# Patient Record
Sex: MALE | Race: BLACK OR AFRICAN AMERICAN | NOT HISPANIC OR LATINO | Employment: UNEMPLOYED | URBAN - METROPOLITAN AREA
[De-identification: names, ages, dates, MRNs, and addresses within clinical notes are randomized per-mention and may not be internally consistent; named-entity substitution may affect disease eponyms.]

---

## 2024-07-22 ENCOUNTER — HOSPITAL ENCOUNTER (EMERGENCY)
Facility: HOSPITAL | Age: 59
Discharge: HOME | End: 2024-07-23
Payer: COMMERCIAL

## 2024-07-22 VITALS
WEIGHT: 180 LBS | TEMPERATURE: 98.5 F | DIASTOLIC BLOOD PRESSURE: 86 MMHG | SYSTOLIC BLOOD PRESSURE: 123 MMHG | BODY MASS INDEX: 25.77 KG/M2 | OXYGEN SATURATION: 96 % | HEART RATE: 106 BPM | RESPIRATION RATE: 16 BRPM | HEIGHT: 70 IN

## 2024-07-22 PROCEDURE — 4500999001 HC ED NO CHARGE

## 2024-07-22 ASSESSMENT — PAIN SCALES - GENERAL: PAINLEVEL_OUTOF10: 0 - NO PAIN

## 2024-07-22 ASSESSMENT — COLUMBIA-SUICIDE SEVERITY RATING SCALE - C-SSRS
1. IN THE PAST MONTH, HAVE YOU WISHED YOU WERE DEAD OR WISHED YOU COULD GO TO SLEEP AND NOT WAKE UP?: NO
2. HAVE YOU ACTUALLY HAD ANY THOUGHTS OF KILLING YOURSELF?: NO
6. HAVE YOU EVER DONE ANYTHING, STARTED TO DO ANYTHING, OR PREPARED TO DO ANYTHING TO END YOUR LIFE?: NO

## 2024-07-22 ASSESSMENT — PAIN - FUNCTIONAL ASSESSMENT: PAIN_FUNCTIONAL_ASSESSMENT: 0-10

## 2024-12-01 ENCOUNTER — HOSPITAL ENCOUNTER (EMERGENCY)
Facility: HOSPITAL | Age: 59
Discharge: HOME | End: 2024-12-01
Attending: EMERGENCY MEDICINE
Payer: COMMERCIAL

## 2024-12-01 ENCOUNTER — CLINICAL SUPPORT (OUTPATIENT)
Dept: EMERGENCY MEDICINE | Facility: HOSPITAL | Age: 59
End: 2024-12-01
Payer: COMMERCIAL

## 2024-12-01 VITALS
SYSTOLIC BLOOD PRESSURE: 137 MMHG | TEMPERATURE: 97.5 F | WEIGHT: 180 LBS | HEART RATE: 89 BPM | RESPIRATION RATE: 17 BRPM | BODY MASS INDEX: 25.77 KG/M2 | DIASTOLIC BLOOD PRESSURE: 75 MMHG | OXYGEN SATURATION: 100 % | HEIGHT: 70 IN

## 2024-12-01 DIAGNOSIS — F91.9 BEHAVIOR DISTURBANCE: ICD-10-CM

## 2024-12-01 DIAGNOSIS — T51.94XA ALCOHOL POISONING, UNDETERMINED INTENT, INITIAL ENCOUNTER: Primary | ICD-10-CM

## 2024-12-01 LAB
ALBUMIN SERPL BCP-MCNC: 3.8 G/DL (ref 3.4–5)
ALP SERPL-CCNC: 72 U/L (ref 33–136)
ALT SERPL W P-5'-P-CCNC: 13 U/L (ref 10–52)
AMPHETAMINES UR QL SCN: ABNORMAL
ANION GAP SERPL CALC-SCNC: 15 MMOL/L (ref 10–20)
APAP SERPL-MCNC: <10 UG/ML
APPEARANCE UR: CLEAR
AST SERPL W P-5'-P-CCNC: 22 U/L (ref 9–39)
ATRIAL RATE: 92 BPM
BARBITURATES UR QL SCN: ABNORMAL
BASOPHILS # BLD AUTO: 0.07 X10*3/UL (ref 0–0.1)
BASOPHILS NFR BLD AUTO: 1.1 %
BENZODIAZ UR QL SCN: ABNORMAL
BILIRUB SERPL-MCNC: 0.4 MG/DL (ref 0–1.2)
BILIRUB UR STRIP.AUTO-MCNC: NEGATIVE MG/DL
BUN SERPL-MCNC: 12 MG/DL (ref 6–23)
BZE UR QL SCN: ABNORMAL
CALCIUM SERPL-MCNC: 8.8 MG/DL (ref 8.6–10.6)
CANNABINOIDS UR QL SCN: ABNORMAL
CHLORIDE SERPL-SCNC: 104 MMOL/L (ref 98–107)
CO2 SERPL-SCNC: 22 MMOL/L (ref 21–32)
COLOR UR: NORMAL
CREAT SERPL-MCNC: 0.98 MG/DL (ref 0.5–1.3)
EGFRCR SERPLBLD CKD-EPI 2021: 88 ML/MIN/1.73M*2
EOSINOPHIL # BLD AUTO: 0.29 X10*3/UL (ref 0–0.7)
EOSINOPHIL NFR BLD AUTO: 4.7 %
ERYTHROCYTE [DISTWIDTH] IN BLOOD BY AUTOMATED COUNT: 11.5 % (ref 11.5–14.5)
ETHANOL SERPL-MCNC: 196 MG/DL
FENTANYL+NORFENTANYL UR QL SCN: ABNORMAL
GLUCOSE SERPL-MCNC: 79 MG/DL (ref 74–99)
GLUCOSE UR STRIP.AUTO-MCNC: NORMAL MG/DL
HCT VFR BLD AUTO: 34.5 % (ref 41–52)
HGB BLD-MCNC: 11.9 G/DL (ref 13.5–17.5)
IMM GRANULOCYTES # BLD AUTO: 0.02 X10*3/UL (ref 0–0.7)
IMM GRANULOCYTES NFR BLD AUTO: 0.3 % (ref 0–0.9)
KETONES UR STRIP.AUTO-MCNC: NEGATIVE MG/DL
LEUKOCYTE ESTERASE UR QL STRIP.AUTO: NEGATIVE
LYMPHOCYTES # BLD AUTO: 1.46 X10*3/UL (ref 1.2–4.8)
LYMPHOCYTES NFR BLD AUTO: 23.6 %
MCH RBC QN AUTO: 32.9 PG (ref 26–34)
MCHC RBC AUTO-ENTMCNC: 34.5 G/DL (ref 32–36)
MCV RBC AUTO: 95 FL (ref 80–100)
METHADONE UR QL SCN: ABNORMAL
MONOCYTES # BLD AUTO: 0.57 X10*3/UL (ref 0.1–1)
MONOCYTES NFR BLD AUTO: 9.2 %
NEUTROPHILS # BLD AUTO: 3.78 X10*3/UL (ref 1.2–7.7)
NEUTROPHILS NFR BLD AUTO: 61.1 %
NITRITE UR QL STRIP.AUTO: NEGATIVE
NRBC BLD-RTO: 0 /100 WBCS (ref 0–0)
OPIATES UR QL SCN: ABNORMAL
OXYCODONE+OXYMORPHONE UR QL SCN: ABNORMAL
P AXIS: 46 DEGREES
P OFFSET: 208 MS
P ONSET: 139 MS
PCP UR QL SCN: ABNORMAL
PH UR STRIP.AUTO: 5 [PH]
PLATELET # BLD AUTO: 187 X10*3/UL (ref 150–450)
POTASSIUM SERPL-SCNC: 3.4 MMOL/L (ref 3.5–5.3)
PR INTERVAL: 166 MS
PROT SERPL-MCNC: 7.3 G/DL (ref 6.4–8.2)
PROT UR STRIP.AUTO-MCNC: NEGATIVE MG/DL
Q ONSET: 222 MS
QRS COUNT: 15 BEATS
QRS DURATION: 98 MS
QT INTERVAL: 388 MS
QTC CALCULATION(BAZETT): 479 MS
QTC FREDERICIA: 447 MS
R AXIS: 46 DEGREES
RBC # BLD AUTO: 3.62 X10*6/UL (ref 4.5–5.9)
RBC # UR STRIP.AUTO: NEGATIVE /UL
SALICYLATES SERPL-MCNC: <3 MG/DL
SODIUM SERPL-SCNC: 138 MMOL/L (ref 136–145)
SP GR UR STRIP.AUTO: 1.01
T AXIS: 69 DEGREES
T OFFSET: 416 MS
UROBILINOGEN UR STRIP.AUTO-MCNC: NORMAL MG/DL
VENTRICULAR RATE: 92 BPM
WBC # BLD AUTO: 6.2 X10*3/UL (ref 4.4–11.3)

## 2024-12-01 PROCEDURE — 2500000004 HC RX 250 GENERAL PHARMACY W/ HCPCS (ALT 636 FOR OP/ED): Mod: SE | Performed by: EMERGENCY MEDICINE

## 2024-12-01 PROCEDURE — 80320 DRUG SCREEN QUANTALCOHOLS: CPT

## 2024-12-01 PROCEDURE — 80307 DRUG TEST PRSMV CHEM ANLYZR: CPT

## 2024-12-01 PROCEDURE — 93005 ELECTROCARDIOGRAM TRACING: CPT

## 2024-12-01 PROCEDURE — 93010 ELECTROCARDIOGRAM REPORT: CPT | Performed by: EMERGENCY MEDICINE

## 2024-12-01 PROCEDURE — 99285 EMERGENCY DEPT VISIT HI MDM: CPT | Performed by: EMERGENCY MEDICINE

## 2024-12-01 PROCEDURE — 96372 THER/PROPH/DIAG INJ SC/IM: CPT | Performed by: EMERGENCY MEDICINE

## 2024-12-01 PROCEDURE — 36415 COLL VENOUS BLD VENIPUNCTURE: CPT

## 2024-12-01 PROCEDURE — 80053 COMPREHEN METABOLIC PANEL: CPT

## 2024-12-01 PROCEDURE — 81003 URINALYSIS AUTO W/O SCOPE: CPT

## 2024-12-01 PROCEDURE — 85025 COMPLETE CBC W/AUTO DIFF WBC: CPT

## 2024-12-01 PROCEDURE — 99284 EMERGENCY DEPT VISIT MOD MDM: CPT

## 2024-12-01 RX ORDER — MIDAZOLAM HYDROCHLORIDE 1 MG/ML
5 INJECTION INTRAMUSCULAR; INTRAVENOUS ONCE
Status: DISCONTINUED | OUTPATIENT
Start: 2024-12-01 | End: 2024-12-01 | Stop reason: HOSPADM

## 2024-12-01 RX ORDER — HALOPERIDOL 5 MG/ML
5 INJECTION INTRAMUSCULAR ONCE
Status: COMPLETED | OUTPATIENT
Start: 2024-12-01 | End: 2024-12-01

## 2024-12-01 RX ORDER — MIDAZOLAM HYDROCHLORIDE 5 MG/ML
INJECTION, SOLUTION INTRAMUSCULAR; INTRAVENOUS
Status: COMPLETED
Start: 2024-12-01 | End: 2024-12-01

## 2024-12-01 RX ORDER — HALOPERIDOL 5 MG/ML
INJECTION INTRAMUSCULAR
Status: COMPLETED
Start: 2024-12-01 | End: 2024-12-01

## 2024-12-01 SDOH — HEALTH STABILITY: MENTAL HEALTH: BEHAVIORS/MOOD: FLIGHT OF IDEAS;IMPULSIVE;PACING;RESTLESS

## 2024-12-01 SDOH — HEALTH STABILITY: MENTAL HEALTH: BEHAVIORAL HEALTH(WDL): EXCEPTIONS TO WDL

## 2024-12-01 ASSESSMENT — COLUMBIA-SUICIDE SEVERITY RATING SCALE - C-SSRS
6. HAVE YOU EVER DONE ANYTHING, STARTED TO DO ANYTHING, OR PREPARED TO DO ANYTHING TO END YOUR LIFE?: NO
2. HAVE YOU ACTUALLY HAD ANY THOUGHTS OF KILLING YOURSELF?: NO
1. IN THE PAST MONTH, HAVE YOU WISHED YOU WERE DEAD OR WISHED YOU COULD GO TO SLEEP AND NOT WAKE UP?: NO

## 2024-12-01 NOTE — PROGRESS NOTES
Emergency Medicine Transition of Care Note.    I received Kings Contreras in signout from Dr. Montgomery.  Please see the previous ED provider note for all HPI, PE and MDM up to the time of signout at 0700. This is in addition to the primary record.    In brief Kings Contreras is an 59 y.o. male presenting for   Chief Complaint   Patient presents with    Psychiatric Evaluation     At the time of signout we were awaiting: JEANMARIE james     Medical Decision Making  MDM:    Kings Contreras is a 59 y.o. male with a past medical history of PSUD who presents for psychiatric evaluation.  Patient had been experiencing bizarre destructive behavior outpatient in the setting of intoxication but he does have underlying psychiatric history and do want to be seen by our psychiatry team to ensure no decompensation.  Low concern for medical etiology including infection, metabolic, dementia, delirium, or drug induced psychosis. Medical clearance labs and EKG were obtained and unremarkable. Patient was medically cleared for psychiatric evaluation. EPAT was consulted.      Patient was discussed with EPAT who recommended outpatient management. Patient was offered resources for outpatient psychiatric management and counseling. Patient was discharged home. Patient was given strict return precautions to return if worsening suicidal ideation, homicidal ideation, auditory/visual hallucination, paranoia or psychosis.           Please see ED course for updates on patient status, results, and disposition.     ED Course as of 12/01/24 1747   Sun Dec 01, 2024   1038 CBC and Auto Differential(!)  Mild anemia without leukocytosis or thrombocytopenia [SC]   1038 Comprehensive Metabolic Panel(!)  Very mild hypokalemia otherwise no renal or hepatic or electrolyte abnormalities [SC]   1038 Acute Toxicology Panel, Blood(!)  Elevated alcohol [SC]   1125 Urinalysis with Reflex Microscopic  No concerns for UTI proteinuria or blood [SC]   1252 Drug Screen,  Urine(!)  UDS with cocaine and benzodiazepine positive.  Patient did receive Versed here. [SC]      ED Course User Index  [SC] Michela Mayfield DO         Diagnoses as of 12/01/24 1747   Alcohol poisoning, undetermined intent, initial encounter   Behavior disturbance           Final diagnoses:   [T51.94XA] Alcohol poisoning, undetermined intent, initial encounter   [F91.9] Behavior disturbance           Procedure  Procedures    Patient seen and discussed with Dr. Brian Mayfield DO  PGY-3 Emergency Medicine

## 2024-12-01 NOTE — ED PROVIDER NOTES
"History of Present Illness     History provided by: Patient and Law Enforcement  Limitations to History: Intoxication  External Records Reviewed with Brief Summary:  [facility] ED note from 2024 for STD screening    HPI:  Kings Contreras is a 59 y.o. male with no significant past medical history who is presenting today for psychiatric evaluation. Patient was brought in by public use after being found throwing staff in a apartment lobby. My evaluation patient was not answering questions. Patient was reporting that he was \"crazy\". Patient was not retractable and not answering additional questions.    Physical Exam   Triage vitals:  T 36.4 °C (97.5 °F)  HR 88  /69  RR 18  O2 95 % Supplemental oxygen    General: Awake, alert, in no acute distress  Eyes: Gaze conjugate.  No scleral icterus or injection  HENT: Normo-cephalic, atraumatic. No stridor  CV: Regular rate, regular rhythm. Radial pulses 2+ bilaterally  Resp: Breathing non-labored, speaking in full sentences.  Clear to auscultation bilaterally  GI: Soft, non-distended, non-tender. No rebound or guarding.  MSK/Extremities: No gross bony deformities. Moving all extremities  Skin: Warm. Appropriate color  Neuro: Alert. Oriented. Face symmetric. Speech is fluent.  Gross strength and sensation intact in b/l UE and Les. Ambulating appropriately.  Psych: agitated      Medical Decision Making & ED Course   Medical Decision Makin y.o. male with no significant past medical history is presenting today for psychiatric evaluation. Concern for new onset psychiatric disease versus a substance use disorder intoxication. Patient was agitated on arrival and attempts at verbal de-escalation were unsuccessful. Patient received 5 versed and 5 Haldol. Patient was not placed in restraints afterwards. Will obtain medical screening labs for psychiatric evaluation. Patient's lab work was reviewed it was more will for an alcohol level of 192. CBC showed a mild anemia. " Patient was signed out to incoming provider pending EPAT eval.     ----      Differential diagnoses considered include but are not limited to: Please see MDM.     Social Determinants of Health which Significantly Impact Care: None identified     EKG Independent Interpretation: EKG interpreted by myself. Please see ED Course and [facility] for full interpretation.    Independent Result Review and Interpretation: Results were independently reviewed and interpreted by myself. Please see ED course and [facility] for full interpretation.    Chronic conditions affecting the patient's care: As documented in the MDM    The patient was discussed with the following consultants/services: None    Care Considerations: As per [facility]    ED Course:  ED Course as of 12/02/24 1618   Sun Dec 01, 2024   1038 CBC and Auto Differential(!)  Mild anemia without leukocytosis or thrombocytopenia [SC]   1038 Comprehensive Metabolic Panel(!)  Very mild hypokalemia otherwise no renal or hepatic or electrolyte abnormalities [SC]   1038 Acute Toxicology Panel, Blood(!)  Elevated alcohol [SC]   1125 Urinalysis with Reflex Microscopic  No concerns for UTI proteinuria or blood [SC]   1252 Drug Screen, Urine(!)  UDS with cocaine and benzodiazepine positive.  Patient did receive Versed here. [SC]   Mon Dec 02, 2024   1617 EKG was independently interpreted which showed sinus rhythm intervening 92. No ST segment elevations notable artifact [TI]      ED Course User Index  [TI] Mary Montgomery MD  [SC] Michela Dan, DO         Diagnoses as of 12/02/24 1618   Alcohol poisoning, undetermined intent, initial encounter   Behavior disturbance     Disposition   Patient was signed out to Dr dan at 7 am pending completion of their work-up.  Please see the next provider's transition of care note for the remainder of the patient's care.     Procedures   Procedures    Patient seen and discussed with ED attending physician.    Mary Montgomery MD  Emergency Medicine     Mary Montgomery,  MD  Resident  12/02/24 3761

## 2024-12-01 NOTE — CONSULTS
"  HISTORY OF PRESENT ILLNESS:  Kings Contreras is a 59 y.o. male with a past psychiatric history of depression and alcohol use who presents to [facility] on 12/1 for agitation and AMS. Psychiatry was consulted on 12/1 for assessment.    On chart review, per Nurse Triage Note:  \"Per PD pt was causing a scene in his apartment lobby. Pt talking nonsensical and making sound effects. Unable to cooperate.\"    Also of note, BAL at 0534 on 12/1 was 196. Utox positive for cocaine and benzos (given midazolam in the ED, likely the reason for the positive result). Pt received haldol 5mg IM once and midazolam 5mg IM for agitation at 0428 on 12/1.      On interview, pt is resting comfortably in the ED bed. He states he had a disagreement with someone who was trying to get into his apartment building yesterday, and the  called the  to get him assessed. He states he has been having people get on his nerves the last few days. He states overall he feels good currently, denies any suicidal ideations, intent, plan, HI, or AVH, denies any paranoid delusions or psychotic symptoms. Pt states he goes to [facility] for management of his depression, and states he wants to continue going there to obtain disability income and assistance. He lives alone, denies access to firearms or lethal means, denies any previous psychiatric hospitalizations, previous suicide attempts, and any other dx besides depression. Denies ever going to rehab for substance use. States he drinks every other day, denies complicated withdrawal. Denies other substance use to this writer (though UTOX positive for cocaine metabolites). Pt feels safe at home, states he does not work currently, and is eager to talk to his daughter and relax in his apartment. He states he turns to his ex-wife and daughter for support. He is alert and oriented x4, knows the last three presidents by name. Pt denies other issues at this time, does not give much other " "explanation for his behavior but states he regrets acting out and wants to relax at home. He gave this writer the number for his ex-wife to call and see if he can get a ride home.     Collateral call of spouse, Alice Contreras [phone]: number is not in service.     Attempted Alice at [phone]: left VM, called again - answered. Denied the patient has any previous psychiatric history. Last spoke to the pt two days ago, seemed fine and at baseline at the time. Denies any hx with psychiatric medications, previous SA, or hospitalizations, and denies the pt uses substances besides alcohol to her knowledge.     PSYCHIATRIC REVIEW OF SYSTEMS  As per HPI    Information obtained from personal interview and per chart review:  PSYCHIATRIC HISTORY  Prior diagnoses: depression  Prior hospitalizations: denied  History of suicide attempts: denied  History of self-harm: denied  History of trauma/abuse/loss: denied  History of violence: denied, though stated hx of DV charges    Current psychiatrist: unsure when asked during interview  Current mental health agency: [facility]  Current : denied  Current outpatient treatment: denied  Guardian or payee: self    Current psychiatric medications: zoloft 50mg PO qdaily  Past psychiatric medications: denied  Past psychiatric treatments: denied    Family psychiatric history: denied    SUBSTANCE USE HISTORY   He has no history on file for tobacco use, alcohol use, and drug use.    Tobacco: denied  Alcohol: drinks \"every other day\"     - History of severe withdrawal: denied     - Last use: yesterday evening  Cannabis: former   Other substances: denied to this writer, though UTOX positive for cocaine     - History of overdose: denied  Prior substance use disorder treatment: denied to this writer    SOCIAL HISTORY  Social History     Socioeconomic History    Marital status:    Tobacco Use    Smoking status: Unknown     Social Drivers of Health "     Financial Resource Strain: Low Risk  (3/26/2024)    Received from [facility]    Overall Financial Resource Strain (CARDIA)     Difficulty of Paying Living Expenses: Not hard at all   Food Insecurity: No Food Insecurity (3/26/2024)    Received from [facility]    Hunger Vital Sign     Worried About Running Out of Food in the Last Year: Never true     Ran Out of Food in the Last Year: Never true   Transportation Needs: Unmet Transportation Needs (3/26/2024)    Received from [facility]    PRAPARE - Transportation     Lack of Transportation (Medical): Yes     Lack of Transportation (Non-Medical): Yes   Physical Activity: Sufficiently Active (3/26/2024)    Received from [facility]    Exercise Vital Sign     Days of Exercise per Week: 7 days     Minutes of Exercise per Session: 60 min   Stress: Stress Concern Present (3/26/2024)    Received from [facility]    [employer] - Occupational Stress Questionnaire     Feeling of Stress : To some extent   Social Connections: Socially Isolated (3/26/2024)    Received from [facility]    Social Connection and Isolation Panel [NHANES]     Frequency of Communication with Friends and Family: More than three times a week     Frequency of Social Gatherings with Friends and Family: More than three times a week     Attends Rastafari Services: Never     Active Member of Clubs or Organizations: No     Attends Club or Organization Meetings: Never     Marital Status:    Intimate Partner Violence: Not At Risk (3/26/2024)    Received from [facility]    Humiliation, Afraid, Rape, and Kick questionnaire     Fear of Current or Ex-Partner: No     Emotionally Abused: No     Physically Abused: No     Sexually Abused: No      Current living situation: lives in apartment in [place], [place]  Current employment/source of income: seeking SSDI    Born and raised: in [place], [place]  History of learning difficulty: denied  Employment: unemployed  Marital status:  "   Children: daughter  Social support: ex wife and daughter  Legal history: multiple charges for DV, shoplifting and agg menacing   history: denied  Access to weapons: denied to this writer    PAST MEDICAL HISTORY  History reviewed. No pertinent past medical history.     Prior Head trauma/TBI/LOC/seizure history: denied    PAST SURGICAL HISTORY  History reviewed. No pertinent surgical history.       FAMILY HISTORY  No family history on file.     ALLERGIES  Patient has no known allergies.    Some components of the patient's history were obtained through personal review of the patient's available medical records.    OARRS REVIEW  OARRS checked: yes on 12/1/2024  OARRS comments: score 000    OBJECTIVE    VITALS      7/22/2024    11:43 PM 12/1/2024     5:16 AM   Vitals   Systolic 123 110   Diastolic 86 69   BP Location Right arm Right arm   Heart Rate 106 88   Temp 36.9 °C (98.5 °F) 36.4 °C (97.5 °F)   Resp 16 18   Height 1.778 m (5' 10\") 1.778 m (5' 10\")   Weight (lb) 180 180   BMI 25.83 kg/m2 25.83 kg/m2   BSA (m2) 2.01 m2 2.01 m2        MENTAL STATUS EXAM  Appearance: appears older than stated age, thin with graying beard, in hospital attire  Attitude: overall pleasant and cooperative, potentially minimizing substance use  Behavior: calm, good eye contact  Motor Activity: no PMA/PMR, no tremor or EPS observed on interview  Speech: fast rate, regular tone, rhythm, volume  Mood: \"doing OK\"  Affect: full range, euthymic, laughing and smiling occasionally  Thought Process: linear, logical, goal-oriented overall  Thought Content:  denies current suicidal ideation, intent, plan, HI , or paranoid delusions  Thought Perception: denies auditory or visual hallucinations, does not appear to respond to internal stimuli  Cognition: A&O x4  Insight: limited  Judgement: limited    HOME MEDICATIONS  Medication Documentation Review Audit       Reviewed by Mohini Martinez RN (Registered Nurse) on 12/01/24 at 0430  "     Medication Order Taking? Sig Documenting Provider Last Dose Status            No Medications to Display                                    CURRENT MEDICATIONS  Scheduled medications  midazolam, 5 mg, intramuscular, Once        Continuous medications       PRN medications       LABS  Results for orders placed or performed during the hospital encounter of 12/01/24 (from the past 24 hours)   CBC and Auto Differential   Result Value Ref Range    WBC 6.2 4.4 - 11.3 x10*3/uL    nRBC 0.0 0.0 - 0.0 /100 WBCs    RBC 3.62 (L) 4.50 - 5.90 x10*6/uL    Hemoglobin 11.9 (L) 13.5 - 17.5 g/dL    Hematocrit 34.5 (L) 41.0 - 52.0 %    MCV 95 80 - 100 fL    MCH 32.9 26.0 - 34.0 pg    MCHC 34.5 32.0 - 36.0 g/dL    RDW 11.5 11.5 - 14.5 %    Platelets 187 150 - 450 x10*3/uL    Neutrophils % 61.1 40.0 - 80.0 %    Immature Granulocytes %, Automated 0.3 0.0 - 0.9 %    Lymphocytes % 23.6 13.0 - 44.0 %    Monocytes % 9.2 2.0 - 10.0 %    Eosinophils % 4.7 0.0 - 6.0 %    Basophils % 1.1 0.0 - 2.0 %    Neutrophils Absolute 3.78 1.20 - 7.70 x10*3/uL    Immature Granulocytes Absolute, Automated 0.02 0.00 - 0.70 x10*3/uL    Lymphocytes Absolute 1.46 1.20 - 4.80 x10*3/uL    Monocytes Absolute 0.57 0.10 - 1.00 x10*3/uL    Eosinophils Absolute 0.29 0.00 - 0.70 x10*3/uL    Basophils Absolute 0.07 0.00 - 0.10 x10*3/uL   Comprehensive Metabolic Panel   Result Value Ref Range    Glucose 79 74 - 99 mg/dL    Sodium 138 136 - 145 mmol/L    Potassium 3.4 (L) 3.5 - 5.3 mmol/L    Chloride 104 98 - 107 mmol/L    Bicarbonate 22 21 - 32 mmol/L    Anion Gap 15 10 - 20 mmol/L    Urea Nitrogen 12 6 - 23 mg/dL    Creatinine 0.98 0.50 - 1.30 mg/dL    eGFR 88 >60 mL/min/1.73m*2    Calcium 8.8 8.6 - 10.6 mg/dL    Albumin 3.8 3.4 - 5.0 g/dL    Alkaline Phosphatase 72 33 - 136 U/L    Total Protein 7.3 6.4 - 8.2 g/dL    AST 22 9 - 39 U/L    Bilirubin, Total 0.4 0.0 - 1.2 mg/dL    ALT 13 10 - 52 U/L   Acute Toxicology Panel, Blood   Result Value Ref Range    Acetaminophen  <10.0 10.0 - 30.0 ug/mL    Salicylate  <3 4 - 20 mg/dL    Alcohol 196 (H) <=10 mg/dL   Drug Screen, Urine   Result Value Ref Range    Amphetamine Screen, Urine Presumptive Negative Presumptive Negative    Barbiturate Screen, Urine Presumptive Negative Presumptive Negative    Benzodiazepines Screen, Urine Presumptive Positive (A) Presumptive Negative    Cannabinoid Screen, Urine Presumptive Negative Presumptive Negative    Cocaine Metabolite Screen, Urine Presumptive Positive (A) Presumptive Negative    Fentanyl Screen, Urine Presumptive Negative Presumptive Negative    Opiate Screen, Urine Presumptive Negative Presumptive Negative    Oxycodone Screen, Urine Presumptive Negative Presumptive Negative    PCP Screen, Urine Presumptive Negative Presumptive Negative    Methadone Screen, Urine Presumptive Negative Presumptive Negative   Urinalysis with Reflex Microscopic   Result Value Ref Range    Color, Urine Light-Yellow Light-Yellow, Yellow, Dark-Yellow    Appearance, Urine Clear Clear    Specific Gravity, Urine 1.008 1.005 - 1.035    pH, Urine 5.0 5.0, 5.5, 6.0, 6.5, 7.0, 7.5, 8.0    Protein, Urine NEGATIVE NEGATIVE, 10 (TRACE), 20 (TRACE) mg/dL    Glucose, Urine Normal Normal mg/dL    Blood, Urine NEGATIVE NEGATIVE    Ketones, Urine NEGATIVE NEGATIVE mg/dL    Bilirubin, Urine NEGATIVE NEGATIVE    Urobilinogen, Urine Normal Normal mg/dL    Nitrite, Urine NEGATIVE NEGATIVE    Leukocyte Esterase, Urine NEGATIVE NEGATIVE        IMAGING  No results found.     PSYCHIATRIC RISK ASSESSMENT  Violence Risk Factors:  male, current psychiatric illness, past history of violence, substance abuse , unemployed, and stress/destabilizers  Acute Risk of Harm to Others is Considered: Low  Suicide Risk Factors: male, chronic pain, current psychiatric illness, substance abuse , anxious ruminations, and lack of treatment access, discontinuities in treatment, or recent discharge from hospital  Protective Factors: social  "support/connectedness, positive family relationships, and hopefulness/future-orientation  Acute Risk of Harm to Self is Considered: Low    ASSESSMENT AND PLAN  Kings Contreras is a 59 y.o. male with a past psychiatric history of depression and alcohol use who presents to [facility] on 12/1 for agitation and AMS. Psychiatry was consulted on 12/1 for assessment.    On initial assessment, pt is calm, denying suicidal ideation, intent, plan, HI, or AVH, is organized in thought, and states previous alcohol use which he believes caused him to act out. He is followed by [facility] for depression, currently on sertraline 50mg daily, and states his depression is well controlled, denying any worsening mood symptoms or escalating substance use. Collateral call with ex-wife, whom the patient agreed to reach out too, corroborates the above without any acute concerns or worsening symptoms from her point of view. Pt also with stable housing without access to firearms or stockpiled medications, denies previous psych hospitalization or suicide attempts. Pt confirmed upcoming appointment with [facility]. The least restrictive means for continued safety, stabilization, and management is within the outpatient setting, with relatively close follow-up.     IMPRESSION  MDD by history  Alcohol Use Disorder, moderate  Cocaine Use    RECOMMENDATIONS  Safety:  - Patient does not currently meet criteria for inpatient psychiatric admission.    - To evaluate decision-making capacity, recommend use of the Capacity Evaluation Tool. Search “[facility] Capacity Evaluation\" under SmartText unless the patient has a legal guardian, in which case all decisions per the legal guardian.  - Patient does not require a 1:1 sitter from a psychiatric perspective at this time.  - Defer to primary team decision for 1:1 sitter.  - As with all hospitalized patients, would recommend delirium precautions, as below.    Medications:  -continue with home sertraline 50mg " PO qdaily    Work-up:  - EKG (12/1): QTc of 479ms and vent rate 92 BPM      Follow-up:  - Patient follow-up with [facility], states he has an appointment in a few weeks.    ==========  - Discussed recommendations with primary team.    Thank you for allowing us to participate in the care of this patient. Please page [phone] with any questions or concerns.    Patient discussed with supervising attending Dr. Zapata, who agrees with above assessment and plan.    Fidencio Phillip MD    Medication Consent  Medication Consent: n/a; consult service

## 2024-12-01 NOTE — ED TRIAGE NOTES
Per PD pt was causing a scene in his apartment lobby. Pt talking nonsensical and making sound effects. Unable to cooperate.

## 2025-01-03 ENCOUNTER — APPOINTMENT (OUTPATIENT)
Dept: PRIMARY CARE | Facility: CLINIC | Age: 60
End: 2025-01-03
Payer: COMMERCIAL